# Patient Record
Sex: FEMALE | NOT HISPANIC OR LATINO | URBAN - METROPOLITAN AREA
[De-identification: names, ages, dates, MRNs, and addresses within clinical notes are randomized per-mention and may not be internally consistent; named-entity substitution may affect disease eponyms.]

---

## 2021-06-09 ENCOUNTER — EMERGENCY (EMERGENCY)
Facility: HOSPITAL | Age: 31
LOS: 1 days | Discharge: HOME | End: 2021-06-09
Attending: EMERGENCY MEDICINE | Admitting: EMERGENCY MEDICINE
Payer: COMMERCIAL

## 2021-06-09 VITALS
DIASTOLIC BLOOD PRESSURE: 84 MMHG | TEMPERATURE: 99 F | RESPIRATION RATE: 18 BRPM | HEART RATE: 120 BPM | SYSTOLIC BLOOD PRESSURE: 107 MMHG | OXYGEN SATURATION: 99 %

## 2021-06-09 DIAGNOSIS — Z79.891 LONG TERM (CURRENT) USE OF OPIATE ANALGESIC: ICD-10-CM

## 2021-06-09 DIAGNOSIS — F41.9 ANXIETY DISORDER, UNSPECIFIED: ICD-10-CM

## 2021-06-09 DIAGNOSIS — Z91.018 ALLERGY TO OTHER FOODS: ICD-10-CM

## 2021-06-09 PROCEDURE — 99283 EMERGENCY DEPT VISIT LOW MDM: CPT

## 2021-06-09 RX ORDER — IBUPROFEN 200 MG
600 TABLET ORAL ONCE
Refills: 0 | Status: COMPLETED | OUTPATIENT
Start: 2021-06-09 | End: 2021-06-09

## 2021-06-09 RX ADMIN — Medication 600 MILLIGRAM(S): at 02:38

## 2021-06-09 RX ADMIN — Medication 1 MILLIGRAM(S): at 02:38

## 2021-06-09 NOTE — ED ADULT NURSE NOTE - OBJECTIVE STATEMENT
pt c/o anxiety. States she takes xanax. Pt well dressed, hair disheveled. On cell phone during assessment. Admits to having one drink at her friends house. Denies drug use.

## 2021-06-09 NOTE — ED PROVIDER NOTE - PHYSICAL EXAMINATION
VITAL SIGNS: I have reviewed nursing notes and confirm.  CONSTITUTIONAL: Well-developed; well-nourished; in no acute distress.  SKIN: Skin exam is warm and dry, no acute rash.  HEAD: Normocephalic; atraumatic.  EYES: PERRL, EOM intact; conjunctiva and sclera clear.  ENT: No nasal discharge; airway clear.  NECK: Supple; non tender.  CARD:  Regular rate and rhythm.  RESP: Unlabored.   ABD: soft; non-distended; non-tender  EXT: Normal ROM.   NEURO: Alert, oriented. Grossly unremarkable.  PSYCH: Cooperative, + tearful/anxious

## 2021-06-09 NOTE — ED PROVIDER NOTE - PATIENT PORTAL LINK FT
You can access the FollowMyHealth Patient Portal offered by [facility] by registering at the following website: [email or web address]. By joining [employer]’s FollowMyHealth portal, you will also be able to view your health information using other applications (apps) compatible with our system.

## 2021-06-09 NOTE — ED ADULT TRIAGE NOTE - CHIEF COMPLAINT QUOTE
pt presents with anxiety. states that she normally takes xanax for her anxiety, however, has not taken any tonight because she does not feel that it is effective.

## 2021-06-09 NOTE — ED PROVIDER NOTE - NSFOLLOWUPINSTRUCTIONS_ED_ALL_ED_FT
Anxiety    WHAT YOU NEED TO KNOW:    Anxiety is a condition that causes you to feel extremely worried or nervous. The feelings are so strong that they can cause problems with your daily activities or sleep. Anxiety may be triggered by something you fear, or it may happen without a cause. Family or work stress, smoking, caffeine, and alcohol can increase your risk for anxiety. Certain medicines or health conditions can also increase your risk. Anxiety can become a long-term condition if it is not managed or treated.    DISCHARGE INSTRUCTIONS:    Call your local emergency number (911 in the [place]) if:   •You have chest pain, tightness, or heaviness that may spread to your shoulders, arms, jaw, neck, or back.      •You feel like hurting yourself or someone else.      Call your doctor if:   •Your symptoms get worse or do not get better with treatment.      •Your anxiety keeps you from doing your regular daily activities.      •You have new symptoms since your last visit.      •You have questions or concerns about your condition or care.      Medicines:   •Medicines may be given to help you feel more calm and relaxed, and decrease your symptoms.      •Take your medicine as directed. Contact your healthcare provider if you think your medicine is not helping or if you have side effects. Tell him of her if you are allergic to any medicine. Keep a list of the medicines, vitamins, and herbs you take. Include the amounts, and when and why you take them. Bring the list or the pill bottles to follow-up visits. Carry your medicine list with you in case of an emergency.      Manage anxiety:   •Talk to someone about your anxiety. Your healthcare provider may suggest counseling. Cognitive behavioral therapy can help you understand and change how you react to events that trigger your symptoms. You might feel more comfortable talking with a friend or family member about your anxiety. Choose someone you know will be supportive and encouraging.      •Find ways to relax. Activities such as exercise, meditation, or listening to music can help you relax. Spend time with friends, or do things you enjoy.      •Practice deep breathing. Deep breathing can help you relax when you feel anxious. Focus on taking slow, deep breaths several times a day, or during an anxiety attack. Breathe in through your nose and out through your mouth.      •Create a regular sleep routine. Regular sleep can help you feel calmer during the day. Go to sleep and wake up at the same times every day. Do not watch television or use the computer right before bed. Your room should be comfortable, dark, and quiet.      •Eat a variety of healthy foods. Healthy foods include fruits, vegetables, low-fat dairy products, lean meats, fish, whole-grain breads, and cooked beans. Healthy foods can help you feel less anxious and have more energy.  Healthy Foods           •Exercise regularly. Exercise can increase your energy level. Exercise may also lift your mood and help you sleep better. Your healthcare provider can help you create an exercise plan.   Family Walking for Exercise           •Do not smoke. Nicotine and other chemicals in cigarettes and cigars can increase anxiety. Ask your healthcare provider for information if you currently smoke and need help to quit. E-cigarettes or smokeless tobacco still contain nicotine. Talk to your healthcare provider before you use these products.      •Do not have caffeine. Caffeine can make your symptoms worse. Do not have foods or drinks that are meant to increase your energy level.      •Limit or do not drink alcohol. Ask your healthcare provider if alcohol is safe for you. You may not be able to drink alcohol if you take certain anxiety or depression medicines. Limit alcohol to 1 drink per day if you are a woman. Limit alcohol to 2 drinks per day if you are a man. A drink of alcohol is 12 ounces of beer, 5 ounces of wine, or 1½ ounces of liquor.      •Do not use drugs. Drugs can make your anxiety worse. It can also make anxiety hard to manage. Talk to your healthcare provider if you use drugs and want help to quit.      Follow up with your doctor within 2 weeks or as directed: Write down your questions so you remember to ask them during your visits.

## 2021-06-09 NOTE — ED PROVIDER NOTE - OBJECTIVE STATEMENT
31 y/o M w/hx anxiety on PRN xanax, presents feeling anxious and tearful after having a disturbing conversation with a boy she's dating. Denies violence or feeling unsafe. She didn't take her xanax because she feels that it makes her groggy the next day. Also c/o mild frontal b/l HA, which she gets from time to time. No focal numbness/tingling/weakness. No neck pain/stiffness. Trauma/falls. No substance abuse/intoxication.

## 2022-03-11 ENCOUNTER — TRANSCRIPTION ENCOUNTER (OUTPATIENT)
Age: 32
End: 2022-03-11

## 2022-03-12 ENCOUNTER — EMERGENCY (EMERGENCY)
Facility: HOSPITAL | Age: 32
LOS: 1 days | Discharge: HOME | End: 2022-03-12
Attending: STUDENT IN AN ORGANIZED HEALTH CARE EDUCATION/TRAINING PROGRAM | Admitting: STUDENT IN AN ORGANIZED HEALTH CARE EDUCATION/TRAINING PROGRAM
Payer: MEDICAID

## 2022-03-12 VITALS
RESPIRATION RATE: 18 BRPM | DIASTOLIC BLOOD PRESSURE: 76 MMHG | HEIGHT: 63 IN | SYSTOLIC BLOOD PRESSURE: 114 MMHG | HEART RATE: 105 BPM | TEMPERATURE: 98 F | OXYGEN SATURATION: 98 % | WEIGHT: 154.98 LBS

## 2022-03-12 VITALS
RESPIRATION RATE: 18 BRPM | SYSTOLIC BLOOD PRESSURE: 122 MMHG | HEART RATE: 77 BPM | OXYGEN SATURATION: 98 % | DIASTOLIC BLOOD PRESSURE: 77 MMHG | TEMPERATURE: 98 F

## 2022-03-12 LAB — HCG UR QL: NEGATIVE — SIGNIFICANT CHANGE UP

## 2022-03-12 PROCEDURE — 13151 CMPLX RPR E/N/E/L 1.1-2.5 CM: CPT

## 2022-03-12 PROCEDURE — 99284 EMERGENCY DEPT VISIT MOD MDM: CPT

## 2022-03-12 PROCEDURE — 99285 EMERGENCY DEPT VISIT HI MDM: CPT | Mod: 25

## 2022-03-12 PROCEDURE — 81025 URINE PREGNANCY TEST: CPT

## 2022-03-12 PROCEDURE — 90715 TDAP VACCINE 7 YRS/> IM: CPT

## 2022-03-12 PROCEDURE — 90471 IMMUNIZATION ADMIN: CPT

## 2022-03-12 RX ORDER — IBUPROFEN 200 MG
600 TABLET ORAL ONCE
Refills: 0 | Status: COMPLETED | OUTPATIENT
Start: 2022-03-12 | End: 2022-03-12

## 2022-03-12 RX ORDER — DEXTROAMPHETAMINE SACCHARATE, AMPHETAMINE ASPARTATE, DEXTROAMPHETAMINE SULFATE AND AMPHETAMINE SULFATE 1.875; 1.875; 1.875; 1.875 MG/1; MG/1; MG/1; MG/1
0 TABLET ORAL
Qty: 0 | Refills: 0 | DISCHARGE

## 2022-03-12 RX ORDER — TETANUS TOXOID, REDUCED DIPHTHERIA TOXOID AND ACELLULAR PERTUSSIS VACCINE, ADSORBED 5; 2.5; 8; 8; 2.5 [IU]/.5ML; [IU]/.5ML; UG/.5ML; UG/.5ML; UG/.5ML
0.5 SUSPENSION INTRAMUSCULAR ONCE
Refills: 0 | Status: COMPLETED | OUTPATIENT
Start: 2022-03-12 | End: 2022-03-12

## 2022-03-12 RX ORDER — DEXTROAMPHETAMINE SACCHARATE, AMPHETAMINE ASPARTATE, DEXTROAMPHETAMINE SULFATE AND AMPHETAMINE SULFATE 1.875; 1.875; 1.875; 1.875 MG/1; MG/1; MG/1; MG/1
1 TABLET ORAL
Qty: 0 | Refills: 0 | DISCHARGE

## 2022-03-12 RX ADMIN — Medication 600 MILLIGRAM(S): at 21:59

## 2022-03-12 RX ADMIN — Medication 600 MILLIGRAM(S): at 21:29

## 2022-03-12 RX ADMIN — Medication 1 TABLET(S): at 21:20

## 2022-03-12 RX ADMIN — TETANUS TOXOID, REDUCED DIPHTHERIA TOXOID AND ACELLULAR PERTUSSIS VACCINE, ADSORBED 0.5 MILLILITER(S): 5; 2.5; 8; 8; 2.5 SUSPENSION INTRAMUSCULAR at 21:20

## 2022-03-12 NOTE — ED PROVIDER NOTE - OBJECTIVE STATEMENT
30 y/o female with PMHx of ADHD presents to the ED c/o dog bite around 7PM. Pt was bit by her mom's dog. Dog vaccines are UTD. Tetanus not UTD. 30 y/o female with PMHx of ADHD presents to the ED c/o dog bite around 7PM. Pt was bit by her mom's dog on her nose. Dog vaccines are UTD. Tetanus not UTD.

## 2022-03-12 NOTE — ED ADULT TRIAGE NOTE - CHIEF COMPLAINT QUOTE
Dog bite to right side nose at 7 p.m. Mother's dog. Two superficial lacerations, swelling observed. c/o pain to inner nose and headache.

## 2022-03-12 NOTE — ED PROVIDER NOTE - NSFOLLOWUPINSTRUCTIONS_ED_ALL_ED_FT
Laceration    A laceration is a cut that goes through all of the layers of the skin and into the tissue that is right under the skin. Some lacerations heal on their own. Others need to be closed with skin adhesive strips, skin glue, stitches (sutures), or staples. Proper laceration care minimizes the risk of infection and helps the laceration to heal better.  If non-absorbable stitches or staples have been placed, they must be taken out within the time frame instructed by your healthcare provider.    SEEK IMMEDIATE MEDICAL CARE IF YOU HAVE ANY OF THE FOLLOWING SYMPTOMS: swelling around the wound, worsening pain, drainage from the wound, red streaking going away from your wound, inability to move finger or toe near the laceration, or discoloration of skin near the laceration.        [facility]                                                                                                                                                                                                                                                                                                                                                                                                                                                                                                                                                                                                                                                                                                                                                                                                                                                                              Animal Bite, Adult      Animal bite wounds can be mild or serious. It is important to get medical treatment to prevent infection. Ask your doctor if you need treatment to prevent an infection that can spread from animals to humans (rabies).      Follow these instructions at home:      Wound care    •Follow instructions from your doctor about how to take care of your wound. Make sure you:  •Wash your hands with soap and water before you change your bandage (dressing). If you cannot use soap and water, use hand .      •Change your bandage as told by your doctor.      •Leave stitches (sutures), skin glue, or skin tape (adhesive) strips in place. They may need to stay in place for 2 weeks or longer. If tape strips get loose and curl up, you may trim the loose edges. Do not remove tape strips completely unless your doctor says it is okay.      •Check your wound every day for signs of infection. Check for:  •More redness, swelling, or pain.      •More fluid or blood.      •Warmth.      •Pus or a bad smell.        Medicines     •Take or apply over-the-counter and prescription medicines only as told by your doctor.      •If you were prescribed an antibiotic, take or apply it as told by your doctor. Do not stop using the antibiotic even if your wound gets better.        General instructions      •Keep the injured area raised (elevated) above the level of your heart while you are sitting or lying down.    •If directed, put ice on the injured area.  •Put ice in a plastic bag.      •Place a towel between your skin and the bag.      •Leave the ice on for 20 minutes, 2–3 times per day.        •Keep all follow-up visits as told by your doctor. This is important.        Contact a doctor if:    •You have more redness, swelling, or pain around your wound.      •Your wound feels warm to the touch.      •You have a fever or chills.      •You have a general feeling of sickness (malaise).      •You feel sick to your stomach (nauseous).      •You throw up (vomit).      •You have pain that does not get better.        Get help right away if:    •You have a red streak going away from your wound.    •You have any of these coming from your wound:  •Non-clear fluid.      •More blood.      •Pus or a bad smell.        •You have trouble moving your injured area.      •You lose feeling (have numbness) or feel tingling anywhere on your body.        Summary    •It is important to get the right medical treatment for animal bites. Treatment can help you to not get an infection. Ask your doctor if you need treatment to prevent an infection that can spread from animals to humans (rabies).      •Check your wound every day for signs of infection, such as more redness or swelling instead of less.      •If you have a red streak going away from your wound, get medical help right away.      This information is not intended to replace advice given to you by your health care provider. Make sure you discuss any questions you have with your health care provider.      Document Revised: 10/12/2021 Document Reviewed: 10/12/2021    Elsevier Patient Education © 2022 [employer] Laceration    A laceration is a cut that goes through all of the layers of the skin and into the tissue that is right under the skin. Some lacerations heal on their own. Others need to be closed with skin adhesive strips, skin glue, stitches (sutures), or staples. Proper laceration care minimizes the risk of infection and helps the laceration to heal better.  If non-absorbable stitches or staples have been placed, they must be taken out within the time frame instructed by your healthcare provider.    SEEK IMMEDIATE MEDICAL CARE IF YOU HAVE ANY OF THE FOLLOWING SYMPTOMS: swelling around the wound, worsening pain, drainage from the wound, red streaking going away from your wound, inability to move finger or toe near the laceration, or discoloration of skin near the laceration.    take augmentin 1 tablet twice daily for 10 days  follow up with dr daugherty  take ibuprofen 600mg every 6 hours with food for pain     [facility]                                                                                                                                                                                                                                                                                                                                                                                                                                                                                                                                                                                                                                                                                                                                                                                                                                                                              Animal Bite, Adult      Animal bite wounds can be mild or serious. It is important to get medical treatment to prevent infection. Ask your doctor if you need treatment to prevent an infection that can spread from animals to humans (rabies).      Follow these instructions at home:      Wound care    •Follow instructions from your doctor about how to take care of your wound. Make sure you:  •Wash your hands with soap and water before you change your bandage (dressing). If you cannot use soap and water, use hand .      •Change your bandage as told by your doctor.      •Leave stitches (sutures), skin glue, or skin tape (adhesive) strips in place. They may need to stay in place for 2 weeks or longer. If tape strips get loose and curl up, you may trim the loose edges. Do not remove tape strips completely unless your doctor says it is okay.      •Check your wound every day for signs of infection. Check for:  •More redness, swelling, or pain.      •More fluid or blood.      •Warmth.      •Pus or a bad smell.        Medicines     •Take or apply over-the-counter and prescription medicines only as told by your doctor.      •If you were prescribed an antibiotic, take or apply it as told by your doctor. Do not stop using the antibiotic even if your wound gets better.        General instructions      •Keep the injured area raised (elevated) above the level of your heart while you are sitting or lying down.    •If directed, put ice on the injured area.  •Put ice in a plastic bag.      •Place a towel between your skin and the bag.      •Leave the ice on for 20 minutes, 2–3 times per day.        •Keep all follow-up visits as told by your doctor. This is important.        Contact a doctor if:    •You have more redness, swelling, or pain around your wound.      •Your wound feels warm to the touch.      •You have a fever or chills.      •You have a general feeling of sickness (malaise).      •You feel sick to your stomach (nauseous).      •You throw up (vomit).      •You have pain that does not get better.        Get help right away if:    •You have a red streak going away from your wound.    •You have any of these coming from your wound:  •Non-clear fluid.      •More blood.      •Pus or a bad smell.        •You have trouble moving your injured area.      •You lose feeling (have numbness) or feel tingling anywhere on your body.        Summary    •It is important to get the right medical treatment for animal bites. Treatment can help you to not get an infection. Ask your doctor if you need treatment to prevent an infection that can spread from animals to humans (rabies).      •Check your wound every day for signs of infection, such as more redness or swelling instead of less.      •If you have a red streak going away from your wound, get medical help right away.      This information is not intended to replace advice given to you by your health care provider. Make sure you discuss any questions you have with your health care provider.      Document Revised: 10/12/2021 Document Reviewed: 10/12/2021    Elsevier Patient Education © 2022 Else[employer] Inc[employer]

## 2022-03-12 NOTE — ED PROVIDER NOTE - CARE PROVIDER_API CALL
Yg Mcmahan)  Plastic Surgery  [address] [place] [address], [address] [place] [id number]  Phone: [phone]  Fax: [phone]  Follow Up Time: 4-6 Days

## 2022-03-12 NOTE — ED PROVIDER NOTE - ATTENDING CONTRIBUTION TO CARE
Patient seen with ACP, substantiative portion of visit including medical decision making done by myself in coordination with ACP. In Brief: 31 F history of rhinoplasty here after being bit by her family dog. dog fully vaccinated per patient, unknown tetanus. On exam, several superficial nasal lacerations, no active bleeding. patient requesting plastics evaluation.

## 2022-03-12 NOTE — ED PROVIDER NOTE - PATIENT PORTAL LINK FT
You can access the FollowMyHealth Patient Portal offered by [facility] by registering at the following website: [email or web address]. By joining [employer]’s FollowMyHealth portal, you will also be able to view your health information using other applications (apps) compatible with our system.

## 2022-03-12 NOTE — ED ADULT NURSE NOTE - NS ED NOTE ABUSE RESPONSE YN
June 21, 2018     Patient: Alicia Ohara   YOB: 1987   Date of Visit: 6/21/2018       To Whom it May Concern:    Brendon Osgood is under my professional care  She was seen in my office on 6/21/2018  She may return to work on 6/23  If you have any questions or concerns, please don't hesitate to call           Sincerely,          JOSIE Carvajal        CC: No Recipients
Yes

## 2022-03-12 NOTE — ED ADULT NURSE NOTE - LOCATION
Dog bite to right side nose at 7 p.m. Mother's dog. Two superficial lacerations, swelling observed. c/o pain to inner nose and headache./face

## 2022-03-14 ENCOUNTER — NON-APPOINTMENT (OUTPATIENT)
Age: 32
End: 2022-03-14

## 2022-03-14 ENCOUNTER — TRANSCRIPTION ENCOUNTER (OUTPATIENT)
Age: 32
End: 2022-03-14

## 2022-03-14 ENCOUNTER — LABORATORY RESULT (OUTPATIENT)
Age: 32
End: 2022-03-14

## 2022-03-14 ENCOUNTER — APPOINTMENT (OUTPATIENT)
Dept: INTERNAL MEDICINE | Facility: CLINIC | Age: 32
End: 2022-03-14
Payer: MEDICAID

## 2022-03-14 VITALS
TEMPERATURE: 97.3 F | SYSTOLIC BLOOD PRESSURE: 104 MMHG | HEART RATE: 91 BPM | OXYGEN SATURATION: 97 % | DIASTOLIC BLOOD PRESSURE: 66 MMHG | HEIGHT: 63 IN | BODY MASS INDEX: 29.23 KG/M2 | RESPIRATION RATE: 16 BRPM | WEIGHT: 165 LBS

## 2022-03-14 DIAGNOSIS — F90.9 ATTENTION-DEFICIT HYPERACTIVITY DISORDER, UNSPECIFIED TYPE: ICD-10-CM

## 2022-03-14 DIAGNOSIS — Z78.9 OTHER SPECIFIED HEALTH STATUS: ICD-10-CM

## 2022-03-14 DIAGNOSIS — Z11.3 ENCOUNTER FOR SCREENING FOR INFECTIONS WITH A PREDOMINANTLY SEXUAL MODE OF TRANSMISSION: ICD-10-CM

## 2022-03-14 DIAGNOSIS — Z00.00 ENCOUNTER FOR GENERAL ADULT MEDICAL EXAMINATION W/OUT ABNORMAL FINDINGS: ICD-10-CM

## 2022-03-14 DIAGNOSIS — F41.9 ANXIETY DISORDER, UNSPECIFIED: ICD-10-CM

## 2022-03-14 DIAGNOSIS — Z83.3 FAMILY HISTORY OF DIABETES MELLITUS: ICD-10-CM

## 2022-03-14 DIAGNOSIS — F32.A ANXIETY DISORDER, UNSPECIFIED: ICD-10-CM

## 2022-03-14 PROCEDURE — 99385 PREV VISIT NEW AGE 18-39: CPT

## 2022-03-14 RX ORDER — ALPRAZOLAM 0.5 MG/1
0.5 TABLET ORAL DAILY
Qty: 30 | Refills: 0 | Status: ACTIVE | COMMUNITY
Start: 2022-03-14

## 2022-03-14 RX ORDER — DEXTROAMPHETAMINE SULFATE, DEXTROAMPHETAMINE SACCHARATE, AMPHETAMINE SULFATE AND AMPHETAMINE ASPARTATE 5; 5; 5; 5 MG/1; MG/1; MG/1; MG/1
20 CAPSULE, EXTENDED RELEASE ORAL
Qty: 30 | Refills: 0 | Status: ACTIVE | COMMUNITY

## 2022-03-14 RX ORDER — NORETHINDRONE ACETATE AND ETHINYL ESTRADIOL, ETHINYL ESTRADIOL AND FERROUS FUMARATE 1MG-10(24)
KIT ORAL
Refills: 0 | Status: ACTIVE | COMMUNITY

## 2022-03-14 RX ORDER — DEXTROAMPHETAMINE SACCHARATE, AMPHETAMINE ASPARTATE, DEXTROAMPHETAMINE SULFATE AND AMPHETAMINE SULFATE 5; 5; 5; 5 MG/1; MG/1; MG/1; MG/1
20 TABLET ORAL
Qty: 60 | Refills: 0 | Status: ACTIVE | COMMUNITY
Start: 2022-03-14

## 2022-03-16 NOTE — ASSESSMENT
[FreeTextEntry1] : Has appt with Plastic Surgery and Dermatology,\par Referred to [facility] weight loss program\par Check labs

## 2022-03-16 NOTE — HISTORY OF PRESENT ILLNESS
[FreeTextEntry1] : Here for CPE.\par Overall feeling well\par Pt received J&J COVID vaccine\par Pt is c/o a mol on her face. Pt was also bit by a dog onher face 3 days ago. Wants to see a dermatologist. Pt went to the ER and received sutures and was started on Augmentin.\par Pt wants to see a weightloss doctor [de-identified] : Doesn't smoke. Social alcohol.\par Last GYN check-up 3-4 months ago.\par Exercises once per week\par Pt was bit on the nose by a dog 3 days ago. Went to ER and was treated by a plastic surgeon.\par Wants to see a weight loss doctor.\par Requests STD, HIV testing. Requests pregnancy test.

## 2022-03-16 NOTE — HEALTH RISK ASSESSMENT
[Fair] :  ~his/her~ mood as fair [Never] : Never [Yes] : Yes [de-identified] : social [de-identified] : Exercises once per week

## 2022-03-23 LAB
25(OH)D3 SERPL-MCNC: 16 NG/ML
ALBUMIN SERPL ELPH-MCNC: 4.7 G/DL
ALP BLD-CCNC: 71 U/L
ALT SERPL-CCNC: 17 U/L
ANION GAP SERPL CALC-SCNC: 14 MMOL/L
APPEARANCE: CLEAR
AST SERPL-CCNC: 17 U/L
BASOPHILS # BLD AUTO: 0.02 K/UL
BASOPHILS NFR BLD AUTO: 0.2 %
BILIRUB SERPL-MCNC: 0.5 MG/DL
BILIRUBIN URINE: NEGATIVE
BLOOD URINE: NEGATIVE
BUN SERPL-MCNC: 10 MG/DL
C TRACH RRNA SPEC QL NAA+PROBE: NOT DETECTED
CALCIUM SERPL-MCNC: 10 MG/DL
CHLORIDE SERPL-SCNC: 102 MMOL/L
CHOLEST SERPL-MCNC: 198 MG/DL
CO2 SERPL-SCNC: 21 MMOL/L
COLOR: NORMAL
CREAT SERPL-MCNC: 0.44 MG/DL
EGFR: 134 ML/MIN/1.73M2
EOSINOPHIL # BLD AUTO: 0 K/UL
EOSINOPHIL NFR BLD AUTO: 0 %
ESTIMATED AVERAGE GLUCOSE: 100 MG/DL
FOLATE SERPL-MCNC: 5.4 NG/ML
GLUCOSE QUALITATIVE U: NEGATIVE
GLUCOSE SERPL-MCNC: 75 MG/DL
HAV IGM SER QL: NONREACTIVE
HBA1C MFR BLD HPLC: 5.1 %
HBV CORE IGM SER QL: NONREACTIVE
HBV SURFACE AG SER QL: NONREACTIVE
HCT VFR BLD CALC: 40.9 %
HCV AB SER QL: NONREACTIVE
HCV S/CO RATIO: 0.14 S/CO
HDLC SERPL-MCNC: 82 MG/DL
HGB BLD-MCNC: 13.1 G/DL
HIV1+2 AB SPEC QL IA.RAPID: NONREACTIVE
HSV 1+2 IGG SER IA-IMP: NEGATIVE
HSV 1+2 IGG SER IA-IMP: NEGATIVE
HSV1 IGG SER QL: <0.01 INDEX
HSV1 IGM SER QL: NEGATIVE
HSV2 AB FLD-ACNC: NEGATIVE
HSV2 IGG SER QL: 0.32 INDEX
IMM GRANULOCYTES NFR BLD AUTO: 0.3 %
KETONES URINE: NEGATIVE
LDLC SERPL CALC-MCNC: 100 MG/DL
LEUKOCYTE ESTERASE URINE: ABNORMAL
LYMPHOCYTES # BLD AUTO: 2.98 K/UL
LYMPHOCYTES NFR BLD AUTO: 27.1 %
MAN DIFF?: NORMAL
MCHC RBC-ENTMCNC: 29.2 PG
MCHC RBC-ENTMCNC: 32 GM/DL
MCV RBC AUTO: 91.3 FL
MONOCYTES # BLD AUTO: 0.9 K/UL
MONOCYTES NFR BLD AUTO: 8.2 %
N GONORRHOEA RRNA SPEC QL NAA+PROBE: NOT DETECTED
NEUTROPHILS # BLD AUTO: 7.05 K/UL
NEUTROPHILS NFR BLD AUTO: 64.2 %
NITRITE URINE: NEGATIVE
NONHDLC SERPL-MCNC: 116 MG/DL
PH URINE: 6
PLATELET # BLD AUTO: 331 K/UL
POTASSIUM SERPL-SCNC: 4.1 MMOL/L
PROT SERPL-MCNC: 7.7 G/DL
PROTEIN URINE: NEGATIVE
RBC # BLD: 4.48 M/UL
RBC # FLD: 13 %
SODIUM SERPL-SCNC: 137 MMOL/L
SOURCE AMPLIFICATION: NORMAL
SPECIFIC GRAVITY URINE: 1.01
T PALLIDUM AB SER QL IA: NEGATIVE
TRIGL SERPL-MCNC: 81 MG/DL
TSH SERPL-ACNC: 1.84 UIU/ML
UROBILINOGEN URINE: NORMAL
VIT B12 SERPL-MCNC: 439 PG/ML
WBC # FLD AUTO: 10.98 K/UL

## 2022-04-07 ENCOUNTER — RESULT CHARGE (OUTPATIENT)
Age: 32
End: 2022-04-07

## 2022-07-31 ENCOUNTER — NON-APPOINTMENT (OUTPATIENT)
Age: 32
End: 2022-07-31

## 2023-04-25 ENCOUNTER — APPOINTMENT (OUTPATIENT)
Dept: ORTHOPEDIC SURGERY | Facility: CLINIC | Age: 33
End: 2023-04-25

## 2023-12-21 ENCOUNTER — APPOINTMENT (OUTPATIENT)
Dept: OTOLARYNGOLOGY | Facility: CLINIC | Age: 33
End: 2023-12-21

## 2025-02-15 ENCOUNTER — NON-APPOINTMENT (OUTPATIENT)
Age: 35
End: 2025-02-15

## 2025-04-30 ENCOUNTER — APPOINTMENT (OUTPATIENT)
Dept: PEDIATRIC MEDICAL GENETICS | Facility: CLINIC | Age: 35
End: 2025-04-30